# Patient Record
(demographics unavailable — no encounter records)

---

## 2025-04-02 NOTE — HISTORY OF PRESENT ILLNESS
[de-identified] :  Ms. ERIC MC is a 48-year-old female, with hx of HTN, insomnia,presents for follow up visit She is going through menopause and c/o "really bad hot flushes". Says wakes up sweaty every night Denies SOB, chest pain, abdominal pain, N/V/D, leg swelling, headache, dizziness

## 2025-04-02 NOTE — PHYSICAL EXAM
[No Acute Distress] : no acute distress [Well Nourished] : well nourished [Well Developed] : well developed [Well-Appearing] : well-appearing [Normal Sclera/Conjunctiva] : normal sclera/conjunctiva [PERRL] : pupils equal round and reactive to light [EOMI] : extraocular movements intact [Normal Outer Ear/Nose] : the outer ears and nose were normal in appearance [Normal Oropharynx] : the oropharynx was normal [No JVD] : no jugular venous distention [No Lymphadenopathy] : no lymphadenopathy [Supple] : supple [No Respiratory Distress] : no respiratory distress  [No Accessory Muscle Use] : no accessory muscle use [Clear to Auscultation] : lungs were clear to auscultation bilaterally [Normal Rate] : normal rate  [Regular Rhythm] : with a regular rhythm [Normal S1, S2] : normal S1 and S2 [No Murmur] : no murmur heard [No Edema] : there was no peripheral edema [Soft] : abdomen soft [Non Tender] : non-tender [Non-distended] : non-distended [Normal Bowel Sounds] : normal bowel sounds [Normal Anterior Cervical Nodes] : no anterior cervical lymphadenopathy [No CVA Tenderness] : no CVA  tenderness [No Joint Swelling] : no joint swelling [Grossly Normal Strength/Tone] : grossly normal strength/tone [No Rash] : no rash [Coordination Grossly Intact] : coordination grossly intact [No Focal Deficits] : no focal deficits [Normal Gait] : normal gait [Normal Affect] : the affect was normal [Normal Insight/Judgement] : insight and judgment were intact

## 2025-04-02 NOTE — ASSESSMENT
[FreeTextEntry1] : Follow up  HTN-borderline SBP pt to continue with Amlodipine 2.5mg daily -renewed today Reinforced the importance of following a low sodium diet/increased physical activity. Pt instructed to check BP at home, keep log and bring it with her on her next visit  Hot flushes start veojah- Rx sent to pharmacy  Health maintenance referred for mammogram and breast US  Hx of iron def anemia cont iron supplements  pt does not want blood work today f/u in one month for BP check
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
## 2025-06-01 NOTE — ASSESSMENT
[FreeTextEntry1] : Follow up  anxiety start paroxetine 10mg daily in the morning psychiatry referral  HTN cont Amlodipine 2.5mg daily Reinforced the importance of following a low sodium diet/increased physical activity.  Hot flushes cont veozah  Hx of iron def anemia cont iron supplements

## 2025-06-01 NOTE — ASSESSMENT
[FreeTextEntry1] : Follow up  mammogram and breast results reviewed 6 month f/u  rt breast US-referral given  Anxiety / Panic attacks started on Paxil 10mg one week ago-to continue current dose psychatry referral today psychology referral - given last week- pt states she is looking for a therapist  HTN-initial /110- pt very anxious and crying . Repeat BP done manually by me  150/100- BP likely secondary to pt's anxiety / panic we'll increase Amlodipine to 5mg daily Reinforced the importance of following a low sodium diet/increased physical activity. Pt instructed to check BP at home, keep log and bring it with her on her next visit  Hot flushes cont veojah  Hx of iron def anemia cont iron supplements  FMLA forms completed  f/u in 2 weeks for BP check

## 2025-06-01 NOTE — PHYSICAL EXAM
[No Acute Distress] : no acute distress [Well Nourished] : well nourished [Well Developed] : well developed [Well-Appearing] : well-appearing [Normal Sclera/Conjunctiva] : normal sclera/conjunctiva [Normal Outer Ear/Nose] : the outer ears and nose were normal in appearance [No JVD] : no jugular venous distention [No Lymphadenopathy] : no lymphadenopathy [Supple] : supple [No Respiratory Distress] : no respiratory distress  [No Accessory Muscle Use] : no accessory muscle use [Clear to Auscultation] : lungs were clear to auscultation bilaterally [Normal Rate] : normal rate  [Regular Rhythm] : with a regular rhythm [Normal S1, S2] : normal S1 and S2 [No Murmur] : no murmur heard [No Edema] : there was no peripheral edema [Soft] : abdomen soft [Non Tender] : non-tender [Non-distended] : non-distended [Normal Anterior Cervical Nodes] : no anterior cervical lymphadenopathy [No CVA Tenderness] : no CVA  tenderness [No Joint Swelling] : no joint swelling [Grossly Normal Strength/Tone] : grossly normal strength/tone [No Rash] : no rash [Coordination Grossly Intact] : coordination grossly intact [No Focal Deficits] : no focal deficits [Normal Gait] : normal gait [Normal Affect] : the affect was normal [Normal Insight/Judgement] : insight and judgment were intact [Normal Bowel Sounds] : normal bowel sounds [Speech Grossly Normal] : speech grossly normal [Alert and Oriented x3] : oriented to person, place, and time

## 2025-06-01 NOTE — ADDENDUM
[FreeTextEntry1] : Documented by Kiarra Borrero acting as a scribe for Dr. Randa Pappas. 05/22/2025  All medical records entries made by the scribe were at my, Dr. Pappas, direction and personally dictated by me on 05/22/2025. I have reviewed the chart and agree that the record accurately reflects my personal performance of the history, physical exam, assessment and plan. I have also personally directed, reviewed, and agreed with the chart.

## 2025-06-01 NOTE — ADDENDUM
[FreeTextEntry1] : Documented by Kiarra Borrero acting as a scribe for Dr. Randa Pappas. 05/27/2025  All medical records entries made by the scribe were at my, Dr. Pappas, direction and personally dictated by me on 05/27/2025. I have reviewed the chart and agree that the record accurately reflects my personal performance of the history, physical exam, assessment and plan. I have also personally directed, reviewed, and agreed with the chart.
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
## 2025-06-10 NOTE — ASSESSMENT
[FreeTextEntry1] : Follow up  Anxiety / Panic attacks cont Paxil 10mg followed with psychiatrist and sees therapist  HTN - well controlled Amlodipine was increased to 5mg daily on last visit-to continue with the Amlodipine 5mg daily Reinforced the importance of following a low sodium diet/increased physical activity.  Hot flushes cont veozah  Hx of iron def anemia cont iron supplements we'll check CBC and iron studies today   bloodwork ordered follow up in one week for lab results

## 2025-06-10 NOTE — HISTORY OF PRESENT ILLNESS
[de-identified] : Ms. ERIC MC is a 50 year old female with hx of HTN, insomnia, presenting for a follow up on her BP.   Amlodipine was increased to 5mg on last visit..  Reports compliance with taking it daily. Says her BP has been good at home since she started the higher dose Pt states she is feeling well. Offers no complaints. Denies any SOB, CP, abdominal pain, N/V/D, headache, dizziness, or leg swelling.

## 2025-06-10 NOTE — PHYSICAL EXAM
[No Acute Distress] : no acute distress [Well-Appearing] : well-appearing [Normal Sclera/Conjunctiva] : normal sclera/conjunctiva [No JVD] : no jugular venous distention [Normal Outer Ear/Nose] : the outer ears and nose were normal in appearance [No Lymphadenopathy] : no lymphadenopathy [Supple] : supple [No Respiratory Distress] : no respiratory distress  [No Accessory Muscle Use] : no accessory muscle use [Clear to Auscultation] : lungs were clear to auscultation bilaterally [Normal Rate] : normal rate  [Regular Rhythm] : with a regular rhythm [Normal S1, S2] : normal S1 and S2 [No Edema] : there was no peripheral edema [No Murmur] : no murmur heard [Soft] : abdomen soft [Non Tender] : non-tender [Non-distended] : non-distended [Normal Bowel Sounds] : normal bowel sounds [Normal Anterior Cervical Nodes] : no anterior cervical lymphadenopathy [No Joint Swelling] : no joint swelling [No CVA Tenderness] : no CVA  tenderness [No Rash] : no rash [No Focal Deficits] : no focal deficits [Coordination Grossly Intact] : coordination grossly intact [Normal Gait] : normal gait [Speech Grossly Normal] : speech grossly normal [Alert and Oriented x3] : oriented to person, place, and time [Normal Insight/Judgement] : insight and judgment were intact

## 2025-06-10 NOTE — ADDENDUM
[FreeTextEntry1] : Documented by Kiarra Borrero acting as a scribe for Dr. Randa Pappas. 06/10/2025  All medical records entries made by the scribe were at my, Dr. Pappas, direction and personally dictated by me on 06/10/2025. I have reviewed the chart and agree that the record accurately reflects my personal performance of the history, physical exam, assessment and plan. I have also personally directed, reviewed, and agreed with the chart.

## 2025-06-10 NOTE — PHYSICAL EXAM
[No Acute Distress] : no acute distress [Well-Appearing] : well-appearing [Normal Sclera/Conjunctiva] : normal sclera/conjunctiva [Normal Outer Ear/Nose] : the outer ears and nose were normal in appearance [No JVD] : no jugular venous distention [No Lymphadenopathy] : no lymphadenopathy [Supple] : supple [No Respiratory Distress] : no respiratory distress  [No Accessory Muscle Use] : no accessory muscle use [Clear to Auscultation] : lungs were clear to auscultation bilaterally [Normal Rate] : normal rate  [Regular Rhythm] : with a regular rhythm [Normal S1, S2] : normal S1 and S2 [No Edema] : there was no peripheral edema [No Murmur] : no murmur heard [Soft] : abdomen soft [Non Tender] : non-tender [Non-distended] : non-distended [Normal Bowel Sounds] : normal bowel sounds [Normal Anterior Cervical Nodes] : no anterior cervical lymphadenopathy [No Joint Swelling] : no joint swelling [No CVA Tenderness] : no CVA  tenderness [No Rash] : no rash [Coordination Grossly Intact] : coordination grossly intact [No Focal Deficits] : no focal deficits [Normal Gait] : normal gait [Speech Grossly Normal] : speech grossly normal [Alert and Oriented x3] : oriented to person, place, and time [Normal Insight/Judgement] : insight and judgment were intact

## 2025-07-17 NOTE — PLAN
[FreeTextEntry1] : Follow up   Tick bite  Pt is outside window of post-exposure prophylaxis, we'll check Lyme serology   Anxiety / Panic attacks cont  Paxil 10mg QD followed with psychiatrist and sees therapist  HTN  cont. Amlodipine 5mg QD  Hot flushes cont veozah 45mg QD   Hx of iron def anemia cont. iron supplements  Follow up in 2 weeks.

## 2025-07-17 NOTE — HISTORY OF PRESENT ILLNESS
[Home] : at home, [unfilled] , at the time of the visit. [Medical Office: ([facility])___] : at the medical office located in  [Telehealth (audio & video)] : This visit was provided via telehealth using real-time 2-way audio visual technology. [Verbal consent obtained from patient] : the patient, [unfilled] [de-identified] : Ms. ERIC MC is a 50-year-old female with Hx of HTN, insomnia, seen in Telemedicine for a follow up   Pt reports she went camping last weekend and found a tick on her scalp the evening of 7/12. She has not followed up for prophylaxis since removing the tick. She denies any rash and states she is otherwise feeling well.  Pt reports she has been on medical leave and will be returning to work 8/13/25.  Denies any SOB, CP, abdominal pain, fever, or chills.

## 2025-07-17 NOTE — ADDENDUM
[FreeTextEntry1] : Documented by Marcia Reyes acting as a scribe for Dr. Mars. 07/17/2025   All medical record entries made by the scribe were at my, Dr. Mars, direction and personally dictated by me on 07/17/2025. I have reviewed the chart an agree that the record accurately reflects my personal performance of the history, physical exam, assessment and plan. I have also personally directed, reviewed, and agreed with the chart.

## 2025-07-17 NOTE — HISTORY OF PRESENT ILLNESS
[Home] : at home, [unfilled] , at the time of the visit. [Medical Office: ([facility])___] : at the medical office located in  [Telehealth (audio & video)] : This visit was provided via telehealth using real-time 2-way audio visual technology. [Verbal consent obtained from patient] : the patient, [unfilled] [de-identified] : Ms. ERIC MC is a 50-year-old female with Hx of HTN, insomnia, seen in Telemedicine for a follow up   Pt reports she went camping last weekend and found a tick on her scalp the evening of 7/12. She has not followed up for prophylaxis since removing the tick. She denies any rash and states she is otherwise feeling well.  Pt reports she has been on medical leave and will be returning to work 8/13/25.  Denies any SOB, CP, abdominal pain, fever, or chills.